# Patient Record
Sex: FEMALE | Employment: FULL TIME | URBAN - METROPOLITAN AREA
[De-identification: names, ages, dates, MRNs, and addresses within clinical notes are randomized per-mention and may not be internally consistent; named-entity substitution may affect disease eponyms.]

---

## 2019-02-22 ENCOUNTER — HOSPITAL ENCOUNTER (EMERGENCY)
Age: 44
Discharge: HOME | End: 2019-02-22
Attending: EMERGENCY MEDICINE
Payer: COMMERCIAL

## 2019-02-22 VITALS
HEART RATE: 83 BPM | RESPIRATION RATE: 16 BRPM | SYSTOLIC BLOOD PRESSURE: 145 MMHG | TEMPERATURE: 97.9 F | DIASTOLIC BLOOD PRESSURE: 94 MMHG | OXYGEN SATURATION: 98 %

## 2019-02-22 DIAGNOSIS — S69.91XA FISH HOOK INJURY OF RIGHT HAND, INITIAL ENCOUNTER: Primary | ICD-10-CM

## 2019-02-22 PROCEDURE — 99282 EMERGENCY DEPT VISIT SF MDM: CPT

## 2019-02-22 PROCEDURE — 74011250636 HC RX REV CODE- 250/636: Performed by: NURSE PRACTITIONER

## 2019-02-22 PROCEDURE — 90471 IMMUNIZATION ADMIN: CPT

## 2019-02-22 PROCEDURE — 74011000250 HC RX REV CODE- 250: Performed by: NURSE PRACTITIONER

## 2019-02-22 PROCEDURE — 90715 TDAP VACCINE 7 YRS/> IM: CPT | Performed by: NURSE PRACTITIONER

## 2019-02-22 PROCEDURE — 75810000121 HC INCSN/RMVL FB ANY OTHER SITE

## 2019-02-22 RX ORDER — LIDOCAINE HYDROCHLORIDE AND EPINEPHRINE 10; 10 MG/ML; UG/ML
5 INJECTION, SOLUTION INFILTRATION; PERINEURAL
Status: COMPLETED | OUTPATIENT
Start: 2019-02-22 | End: 2019-02-22

## 2019-02-22 RX ADMIN — LIDOCAINE HYDROCHLORIDE,EPINEPHRINE BITARTRATE 50 MG: 10; .01 INJECTION, SOLUTION INFILTRATION; PERINEURAL at 15:39

## 2019-02-22 RX ADMIN — TETANUS TOXOID, REDUCED DIPHTHERIA TOXOID AND ACELLULAR PERTUSSIS VACCINE, ADSORBED 0.5 ML: 5; 2.5; 8; 8; 2.5 SUSPENSION INTRAMUSCULAR at 15:40

## 2019-02-22 NOTE — DISCHARGE INSTRUCTIONS
Thank you for allowing us to care for you today. Please follow-up with your Primary Care provider in the next 2-3 days if your symptoms do not improve. Plan for home:     Please call, return to the ED or see your Primary Care Provider  if there are signs or symptoms of wound infection: fevers, chills, sweating, fast heartbeat, or wounds with increased warmth, increased redness, malodor (bad smelling), purulent (pus) drainage and/or pain that is increased, new or difficult to control. No Swimming, tub baths or hot tubs until your wounds have healed. Come back to the ER if you have worsening symptoms, fevers over 100.9, shaking chills, nausea or vomiting.

## 2019-02-22 NOTE — ED TRIAGE NOTES
Pt was at [employer] and digging through bin that had fishing riki in it and got fish hook stuck in right hand.

## 2019-02-22 NOTE — ED NOTES
Hand irrigated and wrapped with dressing. Discharge instructions given to patient by MD and nurse. Pt has been given counseling on medication use and verbalizes understanding. Pt ambulated off of unit in no signs of distress.

## 2019-02-22 NOTE — ED PROVIDER NOTES
CC: Fish hook in the right hand. Stuck hand in a bin at [employer] and a fishing hook was caught on the dorsal surface of the right hand Last tetanus . Unknown hook. Will update tetanus. No further complaints. PCP: Clarence Fulton MD 
 
 
The history is provided by the patient. No  was used. Past Medical History:  
Diagnosis Date  Abnormal Pap smear   
 in teens repeat fine  Abnormal Papanicolaou smear of cervix   
 at age 23 - \"froze off abnormal cells\"  HX OTHER MEDICAL   
 5 D&C  2006-twice, 2007, 2010 twice  Infertility 7 miscarriages  Infertility, female   
 hx of 10 miscarriages - reason unknown  Postpartum depression   
 took meds after 2nd delivery No past surgical history on file. Family History:  
Problem Relation Age of Onset  Hypertension Mother  Hypertension Father  Cancer Maternal Grandmother   
     breast and kidney  Hypertension Maternal Grandmother  Hypertension Maternal Grandfather  Cancer Paternal Grandfather   
     throat Social History Socioeconomic History  Marital status:  Spouse name: Not on file  Number of children: Not on file  Years of education: Not on file  Highest education level: Not on file Social Needs  Financial resource strain: Not on file  Food insecurity - worry: Not on file  Food insecurity - inability: Not on file  Transportation needs - medical: Not on file  Transportation needs - non-medical: Not on file Occupational History  Not on file Tobacco Use  Smoking status: Former Smoker Last attempt to quit: 2009 Years since quittin.2 Substance and Sexual Activity  Alcohol use: No  
 Drug use: No  
 Sexual activity: Yes  
  Partners: Male Birth control/protection: None Other Topics Concern  Not on file Social History Narrative  Not on file ALLERGIES: Patient has no known allergies. Review of Systems Skin: Positive for wound. All other systems reviewed and are negative. Vitals:  
 02/22/19 1504 Pulse: 99 SpO2: 99% Physical Exam  
Constitutional: She appears well-developed and well-nourished. HENT:  
Head: Atraumatic. Eyes: EOM are normal.  
Neck: No tracheal deviation present. Pulmonary/Chest: Effort normal. No respiratory distress. Musculoskeletal: Normal range of motion. Right hand: She exhibits laceration. Hands: 
Fish hook between the 2nd and 3rd fingers on the dorsal surface. Hook not all the way through Neurological: She is alert. Skin: Skin is warm and dry. Psychiatric: She has a normal mood and affect. Her behavior is normal. Judgment and thought content normal.  
Nursing note and vitals reviewed. MDM Foreign Body Removal 
Date/Time: 2/22/2019 3:27 PM 
Performed by: Joann Stafford NP Authorized by: Joann Stafford NP Consent:  
  Consent obtained:  Verbal 
  Consent given by:  Patient Risks discussed:  Infection Location: Location:  Hand Hand location:  R hand dorsum Depth: Intradermal 
Pre-procedure details:  
  Imaging:  None Neurovascular status: intact Anesthesia (see MAR for exact dosages): Anesthesia method:  Local infiltration Local anesthetic:  Lidocaine 1% WITH epi Procedure type:  
  Procedure complexity:  Simple Procedure details:  
  Scalpel size:  11 Incision length:  0.5 Dissection of underlying tissues: no   
  Bloodless field: yes Removal mechanism: Forceps Foreign bodies recovered:  1 Description:  Fish hook Intact foreign body removal: yes Post-procedure details:  
  Neurovascular status: intact Confirmation:  No additional foreign bodies on visualization Skin closure:  None Dressing:  Non-adherent dressing Patient tolerance of procedure:   Tolerated well, no immediate complications Assessment & Plan:  
 
Orders Placed This Encounter  diph,Pertuss(AC),Tet Vac-PF (BOOSTRIX) suspension 0.5 mL  lidocaine-EPINEPHrine (XYLOCAINE) 1 %-1:100,000 injection 50 mg Discussed with Burt Castillo MD,ED Provider Janine Young NP 
02/22/19 
3:27 PM 
 
 
3:56 PM 
The patient has been reevaluated. The patient is ready for discharge. The patient's signs, symptoms, diagnosis, and discharge instructions have been discussed and the patient/ family has conveyed their understanding. The patient is to follow up as recommended or return to the ED should their symptoms worsen. Plan has been discussed and the patient is in agreement. LABORATORY TESTS: 
Labs Reviewed - No data to display IMAGING RESULTS: 
No results found. MEDICATIONS GIVEN: 
Medications diph,Pertuss(AC),Tet Vac-PF (BOOSTRIX) suspension 0.5 mL (0.5 mL IntraMUSCular Given 2/22/19 8260) lidocaine-EPINEPHrine (XYLOCAINE) 1 %-1:100,000 injection 50 mg (50 mg IntraDERMal Given by Provider 2/22/19 1532) IMPRESSION: 
1. Fish hook injury of right hand, initial encounter PLAN: 
1. Current Discharge Medication List  
  
CONTINUE these medications which have NOT CHANGED Details  
sertraline (ZOLOFT) 50 mg tablet Take 1 Tab by mouth daily. Qty: 30 Tab, Refills: 0  
  
  
 
2. Follow-up Information Follow up With Specialties Details Why Contact Info Sally Lagunas MD Obstetrics & Gynecology, Gynecology, Obstetrics Schedule an appointment as soon as possible for a visit As needed [address] [place] [address] 
[phone] [address] [place] [address] EMERGENCY DEP Emergency Medicine  As needed, If symptoms worsen [address] [place] [address] 
[phone] 3. Return to ED for new or worsening symptoms Janine Young NP

## 2023-01-26 ENCOUNTER — HOSPITAL ENCOUNTER (EMERGENCY)
Dept: CT IMAGING | Age: 48
End: 2023-01-26
Attending: EMERGENCY MEDICINE
Payer: COMMERCIAL

## 2023-01-26 ENCOUNTER — APPOINTMENT (OUTPATIENT)
Dept: GENERAL RADIOLOGY | Age: 48
End: 2023-01-26
Attending: EMERGENCY MEDICINE
Payer: COMMERCIAL

## 2023-01-26 ENCOUNTER — HOSPITAL ENCOUNTER (INPATIENT)
Age: 48
LOS: 1 days | Discharge: HOME | End: 2023-01-27
Attending: EMERGENCY MEDICINE | Admitting: INTERNAL MEDICINE
Payer: COMMERCIAL

## 2023-01-26 DIAGNOSIS — I49.8 BIGEMINY: ICD-10-CM

## 2023-01-26 DIAGNOSIS — S09.90XA TRAUMATIC INJURY OF HEAD, INITIAL ENCOUNTER: ICD-10-CM

## 2023-01-26 DIAGNOSIS — I49.3 PVC'S (PREMATURE VENTRICULAR CONTRACTIONS): ICD-10-CM

## 2023-01-26 DIAGNOSIS — R55 SYNCOPE AND COLLAPSE: Primary | ICD-10-CM

## 2023-01-26 LAB
ALBUMIN SERPL-MCNC: 3.9 G/DL (ref 3.5–5)
ALBUMIN/GLOB SERPL: 1.2 (ref 1.1–2.2)
ALP SERPL-CCNC: 60 U/L (ref 45–117)
ALT SERPL-CCNC: 70 U/L (ref 12–78)
ANION GAP SERPL CALC-SCNC: 8 MMOL/L (ref 5–15)
APPEARANCE UR: ABNORMAL
AST SERPL-CCNC: 48 U/L (ref 15–37)
BACTERIA URNS QL MICRO: NEGATIVE /HPF
BASOPHILS # BLD: 0 K/UL (ref 0–0.1)
BASOPHILS NFR BLD: 0 % (ref 0–1)
BILIRUB SERPL-MCNC: 0.5 MG/DL (ref 0.2–1)
BILIRUB UR QL: NEGATIVE
BUN SERPL-MCNC: 16 MG/DL (ref 6–20)
BUN/CREAT SERPL: 20 (ref 12–20)
CALCIUM SERPL-MCNC: 8.6 MG/DL (ref 8.5–10.1)
CHLORIDE SERPL-SCNC: 104 MMOL/L (ref 97–108)
CO2 SERPL-SCNC: 26 MMOL/L (ref 21–32)
COLOR UR: ABNORMAL
COMMENT, HOLDF: NORMAL
CREAT SERPL-MCNC: 0.82 MG/DL (ref 0.55–1.02)
DIFFERENTIAL METHOD BLD: ABNORMAL
EOSINOPHIL # BLD: 0 K/UL (ref 0–0.4)
EOSINOPHIL NFR BLD: 0 % (ref 0–7)
EPITH CASTS URNS QL MICRO: ABNORMAL /LPF
ERYTHROCYTE [DISTWIDTH] IN BLOOD BY AUTOMATED COUNT: 11.8 % (ref 11.5–14.5)
FLUAV RNA SPEC QL NAA+PROBE: NOT DETECTED
FLUBV RNA SPEC QL NAA+PROBE: NOT DETECTED
GLOBULIN SER CALC-MCNC: 3.2 G/DL (ref 2–4)
GLUCOSE SERPL-MCNC: 108 MG/DL (ref 65–100)
GLUCOSE UR STRIP.AUTO-MCNC: NEGATIVE MG/DL
HCT VFR BLD AUTO: 39.3 % (ref 35–47)
HGB BLD-MCNC: 13 G/DL (ref 11.5–16)
HGB UR QL STRIP: NEGATIVE
IMM GRANULOCYTES # BLD AUTO: 0 K/UL (ref 0–0.04)
IMM GRANULOCYTES NFR BLD AUTO: 0 % (ref 0–0.5)
KETONES UR QL STRIP.AUTO: NEGATIVE MG/DL
LEUKOCYTE ESTERASE UR QL STRIP.AUTO: ABNORMAL
LYMPHOCYTES # BLD: 0.5 K/UL (ref 0.8–3.5)
LYMPHOCYTES NFR BLD: 6 % (ref 12–49)
MCH RBC QN AUTO: 29.8 PG (ref 26–34)
MCHC RBC AUTO-ENTMCNC: 33.1 G/DL (ref 30–36.5)
MCV RBC AUTO: 90.1 FL (ref 80–99)
MONOCYTES # BLD: 0.3 K/UL (ref 0–1)
MONOCYTES NFR BLD: 4 % (ref 5–13)
NEUTS SEG # BLD: 6.8 K/UL (ref 1.8–8)
NEUTS SEG NFR BLD: 90 % (ref 32–75)
NITRITE UR QL STRIP.AUTO: NEGATIVE
NRBC # BLD: 0 K/UL (ref 0–0.01)
NRBC BLD-RTO: 0 PER 100 WBC
PH UR STRIP: 8 (ref 5–8)
PLATELET # BLD AUTO: 193 K/UL (ref 150–400)
PMV BLD AUTO: 10.6 FL (ref 8.9–12.9)
POTASSIUM SERPL-SCNC: 4.1 MMOL/L (ref 3.5–5.1)
PROT SERPL-MCNC: 7.1 G/DL (ref 6.4–8.2)
PROT UR STRIP-MCNC: ABNORMAL MG/DL
RBC # BLD AUTO: 4.36 M/UL (ref 3.8–5.2)
RBC #/AREA URNS HPF: ABNORMAL /HPF (ref 0–5)
RBC MORPH BLD: ABNORMAL
SAMPLES BEING HELD,HOLD: NORMAL
SARS-COV-2, COV2: NOT DETECTED
SODIUM SERPL-SCNC: 138 MMOL/L (ref 136–145)
SP GR UR REFRACTOMETRY: 1.02 (ref 1–1.03)
TROPONIN-HIGH SENSITIVITY: <4 NG/L (ref 0–51)
UA: UC IF INDICATED,UAUC: ABNORMAL
UROBILINOGEN UR QL STRIP.AUTO: 1 EU/DL (ref 0.2–1)
WBC # BLD AUTO: 7.6 K/UL (ref 3.6–11)
WBC URNS QL MICRO: ABNORMAL /HPF (ref 0–4)

## 2023-01-26 PROCEDURE — 85025 COMPLETE CBC W/AUTO DIFF WBC: CPT

## 2023-01-26 PROCEDURE — 81001 URINALYSIS AUTO W/SCOPE: CPT

## 2023-01-26 PROCEDURE — 96360 HYDRATION IV INFUSION INIT: CPT

## 2023-01-26 PROCEDURE — 99285 EMERGENCY DEPT VISIT HI MDM: CPT

## 2023-01-26 PROCEDURE — 70450 CT HEAD/BRAIN W/O DYE: CPT

## 2023-01-26 PROCEDURE — 80053 COMPREHEN METABOLIC PANEL: CPT

## 2023-01-26 PROCEDURE — 71046 X-RAY EXAM CHEST 2 VIEWS: CPT

## 2023-01-26 PROCEDURE — 74011250637 HC RX REV CODE- 250/637: Performed by: EMERGENCY MEDICINE

## 2023-01-26 PROCEDURE — 74011250636 HC RX REV CODE- 250/636: Performed by: EMERGENCY MEDICINE

## 2023-01-26 PROCEDURE — 70486 CT MAXILLOFACIAL W/O DYE: CPT

## 2023-01-26 PROCEDURE — 65270000046 HC RM TELEMETRY

## 2023-01-26 PROCEDURE — G0378 HOSPITAL OBSERVATION PER HR: HCPCS

## 2023-01-26 PROCEDURE — 36415 COLL VENOUS BLD VENIPUNCTURE: CPT

## 2023-01-26 PROCEDURE — 93005 ELECTROCARDIOGRAM TRACING: CPT

## 2023-01-26 PROCEDURE — 84484 ASSAY OF TROPONIN QUANT: CPT

## 2023-01-26 PROCEDURE — 87636 SARSCOV2 & INF A&B AMP PRB: CPT

## 2023-01-26 RX ORDER — METOPROLOL TARTRATE 25 MG/1
25 TABLET, FILM COATED ORAL ONCE
Status: COMPLETED | OUTPATIENT
Start: 2023-01-26 | End: 2023-01-26

## 2023-01-26 RX ORDER — PAROXETINE HYDROCHLORIDE 20 MG/1
20 TABLET, FILM COATED ORAL DAILY
COMMUNITY

## 2023-01-26 RX ORDER — ACETAMINOPHEN 325 MG/1
650 TABLET ORAL
Status: COMPLETED | OUTPATIENT
Start: 2023-01-26 | End: 2023-01-26

## 2023-01-26 RX ADMIN — METOPROLOL TARTRATE 25 MG: 25 TABLET, FILM COATED ORAL at 22:51

## 2023-01-26 RX ADMIN — SODIUM CHLORIDE 1000 ML: 9 INJECTION, SOLUTION INTRAVENOUS at 18:47

## 2023-01-26 RX ADMIN — ACETAMINOPHEN 650 MG: 325 TABLET ORAL at 20:08

## 2023-01-26 RX ADMIN — SODIUM CHLORIDE 1000 ML: 9 INJECTION, SOLUTION INTRAVENOUS at 22:52

## 2023-01-26 NOTE — ED TRIAGE NOTES
Pt comes to ED from home with reports of not feeling well all day. Around 1700 pt went upstairs and had a syncopal episode. Pt reports loosing consciousness. She states she woke up to her son looking over her. She has an abrasion to left forehead/above left eye brow.

## 2023-01-27 ENCOUNTER — TELEPHONE (OUTPATIENT)
Dept: CARDIOLOGY CLINIC | Age: 48
End: 2023-01-27

## 2023-01-27 ENCOUNTER — APPOINTMENT (OUTPATIENT)
Dept: NON INVASIVE DIAGNOSTICS | Age: 48
End: 2023-01-27
Attending: INTERNAL MEDICINE
Payer: COMMERCIAL

## 2023-01-27 VITALS
RESPIRATION RATE: 15 BRPM | WEIGHT: 188 LBS | DIASTOLIC BLOOD PRESSURE: 82 MMHG | TEMPERATURE: 99.2 F | SYSTOLIC BLOOD PRESSURE: 115 MMHG | BODY MASS INDEX: 32.1 KG/M2 | HEART RATE: 75 BPM | OXYGEN SATURATION: 96 % | HEIGHT: 64 IN

## 2023-01-27 LAB
ALBUMIN SERPL-MCNC: 3.2 G/DL (ref 3.5–5)
ALBUMIN/GLOB SERPL: 1.1 (ref 1.1–2.2)
ALP SERPL-CCNC: 52 U/L (ref 45–117)
ALT SERPL-CCNC: 52 U/L (ref 12–78)
ANION GAP SERPL CALC-SCNC: 4 MMOL/L (ref 5–15)
AST SERPL-CCNC: 28 U/L (ref 15–37)
ATRIAL RATE: 100 BPM
ATRIAL RATE: 109 BPM
ATRIAL RATE: 78 BPM
BASOPHILS # BLD: 0 K/UL (ref 0–0.1)
BASOPHILS NFR BLD: 0 % (ref 0–1)
BILIRUB SERPL-MCNC: 0.4 MG/DL (ref 0.2–1)
BUN SERPL-MCNC: 10 MG/DL (ref 6–20)
BUN/CREAT SERPL: 17 (ref 12–20)
CALCIUM SERPL-MCNC: 8.3 MG/DL (ref 8.5–10.1)
CALCULATED P AXIS, ECG09: 45 DEGREES
CALCULATED P AXIS, ECG09: 51 DEGREES
CALCULATED P AXIS, ECG09: 63 DEGREES
CALCULATED R AXIS, ECG10: 16 DEGREES
CALCULATED R AXIS, ECG10: 68 DEGREES
CALCULATED R AXIS, ECG10: 68 DEGREES
CALCULATED T AXIS, ECG11: 28 DEGREES
CALCULATED T AXIS, ECG11: 77 DEGREES
CALCULATED T AXIS, ECG11: 80 DEGREES
CHLORIDE SERPL-SCNC: 112 MMOL/L (ref 97–108)
CHOLEST SERPL-MCNC: 161 MG/DL
CO2 SERPL-SCNC: 26 MMOL/L (ref 21–32)
CREAT SERPL-MCNC: 0.6 MG/DL (ref 0.55–1.02)
D DIMER PPP FEU-MCNC: 0.79 MG/L FEU (ref 0–0.65)
DIAGNOSIS, 93000: NORMAL
DIFFERENTIAL METHOD BLD: NORMAL
EOSINOPHIL # BLD: 0 K/UL (ref 0–0.4)
EOSINOPHIL NFR BLD: 0 % (ref 0–7)
ERYTHROCYTE [DISTWIDTH] IN BLOOD BY AUTOMATED COUNT: 11.9 % (ref 11.5–14.5)
GLOBULIN SER CALC-MCNC: 3 G/DL (ref 2–4)
GLUCOSE SERPL-MCNC: 105 MG/DL (ref 65–100)
HCT VFR BLD AUTO: 36.1 % (ref 35–47)
HDLC SERPL-MCNC: 45 MG/DL
HDLC SERPL: 3.6 (ref 0–5)
HGB BLD-MCNC: 11.9 G/DL (ref 11.5–16)
IMM GRANULOCYTES # BLD AUTO: 0 K/UL (ref 0–0.04)
IMM GRANULOCYTES NFR BLD AUTO: 0 % (ref 0–0.5)
LDLC SERPL CALC-MCNC: 95.6 MG/DL (ref 0–100)
LYMPHOCYTES # BLD: 0.9 K/UL (ref 0.8–3.5)
LYMPHOCYTES NFR BLD: 23 % (ref 12–49)
MAGNESIUM SERPL-MCNC: 2.1 MG/DL (ref 1.6–2.4)
MCH RBC QN AUTO: 29.5 PG (ref 26–34)
MCHC RBC AUTO-ENTMCNC: 33 G/DL (ref 30–36.5)
MCV RBC AUTO: 89.6 FL (ref 80–99)
MONOCYTES # BLD: 0.2 K/UL (ref 0–1)
MONOCYTES NFR BLD: 5 % (ref 5–13)
NEUTS SEG # BLD: 2.8 K/UL (ref 1.8–8)
NEUTS SEG NFR BLD: 72 % (ref 32–75)
NRBC # BLD: 0 K/UL (ref 0–0.01)
NRBC BLD-RTO: 0 PER 100 WBC
P-R INTERVAL, ECG05: 152 MS
P-R INTERVAL, ECG05: 156 MS
P-R INTERVAL, ECG05: 160 MS
PHOSPHATE SERPL-MCNC: 3.2 MG/DL (ref 2.6–4.7)
PLATELET # BLD AUTO: 181 K/UL (ref 150–400)
PMV BLD AUTO: 10.3 FL (ref 8.9–12.9)
POTASSIUM SERPL-SCNC: 3.6 MMOL/L (ref 3.5–5.1)
PROT SERPL-MCNC: 6.2 G/DL (ref 6.4–8.2)
Q-T INTERVAL, ECG07: 332 MS
Q-T INTERVAL, ECG07: 350 MS
Q-T INTERVAL, ECG07: 378 MS
QRS DURATION, ECG06: 80 MS
QRS DURATION, ECG06: 84 MS
QRS DURATION, ECG06: 84 MS
QTC CALCULATION (BEZET), ECG08: 428 MS
QTC CALCULATION (BEZET), ECG08: 430 MS
QTC CALCULATION (BEZET), ECG08: 471 MS
RBC # BLD AUTO: 4.03 M/UL (ref 3.8–5.2)
SODIUM SERPL-SCNC: 142 MMOL/L (ref 136–145)
TRIGL SERPL-MCNC: 102 MG/DL (ref ?–150)
TROPONIN-HIGH SENSITIVITY: 4 NG/L (ref 0–51)
TSH SERPL DL<=0.05 MIU/L-ACNC: 0.71 UIU/ML (ref 0.36–3.74)
VENTRICULAR RATE, ECG03: 100 BPM
VENTRICULAR RATE, ECG03: 109 BPM
VENTRICULAR RATE, ECG03: 78 BPM
VLDLC SERPL CALC-MCNC: 20.4 MG/DL
WBC # BLD AUTO: 4 K/UL (ref 3.6–11)

## 2023-01-27 PROCEDURE — 99222 1ST HOSP IP/OBS MODERATE 55: CPT | Performed by: SPECIALIST

## 2023-01-27 PROCEDURE — 84100 ASSAY OF PHOSPHORUS: CPT

## 2023-01-27 PROCEDURE — 85379 FIBRIN DEGRADATION QUANT: CPT

## 2023-01-27 PROCEDURE — 74011250637 HC RX REV CODE- 250/637: Performed by: INTERNAL MEDICINE

## 2023-01-27 PROCEDURE — G0378 HOSPITAL OBSERVATION PER HR: HCPCS

## 2023-01-27 PROCEDURE — 74011000250 HC RX REV CODE- 250: Performed by: INTERNAL MEDICINE

## 2023-01-27 PROCEDURE — 85025 COMPLETE CBC W/AUTO DIFF WBC: CPT

## 2023-01-27 PROCEDURE — 80061 LIPID PANEL: CPT

## 2023-01-27 PROCEDURE — 84443 ASSAY THYROID STIM HORMONE: CPT

## 2023-01-27 PROCEDURE — 93005 ELECTROCARDIOGRAM TRACING: CPT

## 2023-01-27 PROCEDURE — 74011250636 HC RX REV CODE- 250/636: Performed by: INTERNAL MEDICINE

## 2023-01-27 PROCEDURE — 84484 ASSAY OF TROPONIN QUANT: CPT

## 2023-01-27 PROCEDURE — 96372 THER/PROPH/DIAG INJ SC/IM: CPT

## 2023-01-27 PROCEDURE — 36415 COLL VENOUS BLD VENIPUNCTURE: CPT

## 2023-01-27 PROCEDURE — 80053 COMPREHEN METABOLIC PANEL: CPT

## 2023-01-27 PROCEDURE — 96361 HYDRATE IV INFUSION ADD-ON: CPT

## 2023-01-27 PROCEDURE — 83735 ASSAY OF MAGNESIUM: CPT

## 2023-01-27 RX ORDER — ACETAMINOPHEN 650 MG/1
650 SUPPOSITORY RECTAL
Status: DISCONTINUED | OUTPATIENT
Start: 2023-01-27 | End: 2023-01-27 | Stop reason: HOSPADM

## 2023-01-27 RX ORDER — PAROXETINE HYDROCHLORIDE 20 MG/1
20 TABLET, FILM COATED ORAL DAILY
Status: DISCONTINUED | OUTPATIENT
Start: 2023-01-27 | End: 2023-01-27 | Stop reason: HOSPADM

## 2023-01-27 RX ORDER — POLYETHYLENE GLYCOL 3350 17 G/17G
17 POWDER, FOR SOLUTION ORAL DAILY PRN
Status: DISCONTINUED | OUTPATIENT
Start: 2023-01-27 | End: 2023-01-27 | Stop reason: HOSPADM

## 2023-01-27 RX ORDER — SODIUM CHLORIDE 0.9 % (FLUSH) 0.9 %
5-40 SYRINGE (ML) INJECTION EVERY 8 HOURS
Status: DISCONTINUED | OUTPATIENT
Start: 2023-01-27 | End: 2023-01-27 | Stop reason: HOSPADM

## 2023-01-27 RX ORDER — ENOXAPARIN SODIUM 100 MG/ML
40 INJECTION SUBCUTANEOUS DAILY
Status: DISCONTINUED | OUTPATIENT
Start: 2023-01-27 | End: 2023-01-27 | Stop reason: HOSPADM

## 2023-01-27 RX ORDER — ONDANSETRON 4 MG/1
4 TABLET, ORALLY DISINTEGRATING ORAL
Status: DISCONTINUED | OUTPATIENT
Start: 2023-01-27 | End: 2023-01-27 | Stop reason: HOSPADM

## 2023-01-27 RX ORDER — SODIUM CHLORIDE 0.9 % (FLUSH) 0.9 %
5-40 SYRINGE (ML) INJECTION AS NEEDED
Status: DISCONTINUED | OUTPATIENT
Start: 2023-01-27 | End: 2023-01-27 | Stop reason: HOSPADM

## 2023-01-27 RX ORDER — ACETAMINOPHEN 325 MG/1
650 TABLET ORAL
Status: DISCONTINUED | OUTPATIENT
Start: 2023-01-27 | End: 2023-01-27 | Stop reason: HOSPADM

## 2023-01-27 RX ORDER — ONDANSETRON 2 MG/ML
4 INJECTION INTRAMUSCULAR; INTRAVENOUS
Status: DISCONTINUED | OUTPATIENT
Start: 2023-01-27 | End: 2023-01-27 | Stop reason: HOSPADM

## 2023-01-27 RX ORDER — SODIUM CHLORIDE, SODIUM LACTATE, POTASSIUM CHLORIDE, CALCIUM CHLORIDE 600; 310; 30; 20 MG/100ML; MG/100ML; MG/100ML; MG/100ML
100 INJECTION, SOLUTION INTRAVENOUS CONTINUOUS
Status: DISCONTINUED | OUTPATIENT
Start: 2023-01-27 | End: 2023-01-27 | Stop reason: HOSPADM

## 2023-01-27 RX ADMIN — SODIUM CHLORIDE, PRESERVATIVE FREE 10 ML: 5 INJECTION INTRAVENOUS at 06:33

## 2023-01-27 RX ADMIN — PAROXETINE HYDROCHLORIDE 20 MG: 20 TABLET, FILM COATED ORAL at 08:40

## 2023-01-27 RX ADMIN — SODIUM CHLORIDE, POTASSIUM CHLORIDE, SODIUM LACTATE AND CALCIUM CHLORIDE 100 ML/HR: 600; 310; 30; 20 INJECTION, SOLUTION INTRAVENOUS at 06:33

## 2023-01-27 RX ADMIN — ENOXAPARIN SODIUM 40 MG: 100 INJECTION SUBCUTANEOUS at 08:42

## 2023-01-27 RX ADMIN — ACETAMINOPHEN 650 MG: 325 TABLET ORAL at 08:40

## 2023-01-27 NOTE — ED PROVIDER NOTES
Hypotension   Functional status baseline:  [EPIC#1537^NOTE}   Syncope   Patient presents emergency department today after having a syncopal event. Patient says that she was not feeling well today when she suddenly \"passed out. \"  She did hit the side of her face and head. After feeling lightheaded and does also describe some sensation of not feeling during the day. However denies any fevers chills or cough. No active chest pain no palpitations. No fevers or chills. No neck pain. Does have some mild headache related to her head injury after passing out. No associated neurological weakness. Past Medical History:   Diagnosis Date    Abnormal Pap smear     in teens repeat fine    Abnormal Papanicolaou smear of cervix     at age 23 - \"froze off abnormal cells\"    HX OTHER MEDICAL     5 D&C  2006-twice, 2007, 2010 twice    Infertility     7 miscarriages    Infertility, female     hx of 10 miscarriages - reason unknown    Postpartum depression     took meds after 2nd delivery       No past surgical history on file.       Family History:   Problem Relation Age of Onset    Hypertension Mother     Hypertension Father     Cancer Maternal Grandmother         breast and kidney    Hypertension Maternal Grandmother     Hypertension Maternal Grandfather     Cancer Paternal Grandfather         throat       Social History     Socioeconomic History    Marital status:      Spouse name: Not on file    Number of children: Not on file    Years of education: Not on file    Highest education level: Not on file   Occupational History    Not on file   Tobacco Use    Smoking status: Former     Types: Cigarettes     Quit date: 2009     Years since quittin.1    Smokeless tobacco: Not on file   Substance and Sexual Activity    Alcohol use: No    Drug use: No    Sexual activity: Yes     Partners: Male     Birth control/protection: None   Other Topics Concern    Not on file   Social History Narrative    Works for [employer]     Social Determinants of Health     Financial Resource Strain: Not on file   Food Insecurity: Not on file   Transportation Needs: Not on file   Physical Activity: Not on file   Stress: Not on file   Social Connections: Not on file   Intimate Partner Violence: Not on file   Housing Stability: Not on file         ALLERGIES: Patient has no known allergies. Review of Systems   Cardiovascular:  Positive for syncope. All review of systems reviewed and otherwise negative for except for HPI  Vitals:    01/26/23 1815 01/26/23 1834 01/26/23 2118   BP: 113/67 99/68 95/61   Pulse: (!) 104 (!) 101 (!) 104   Resp: 20 18 24   Temp: 98.6 °F (37 °C)  98.6 °F (37 °C)   SpO2: 99%  96%   Weight: 85.3 kg (188 lb)     Height: 5' 4\" (1.626 m)              Physical Exam   pulse Ox Evaluation: Normal.    Physical Exam: PHYSICAL EXAMINATION  GENERAL: awake, alert and in no acute distress. A and O x 3. HEENT: Normocephalic, atraumatic. The pupils are equal, round and react to light appropriately. Extraoccular motions appear intact. Mucous membranes moist. Clear TM bilaterally. NECK: Trachea midline. No JVD. LUNGS: CTAB, no increased work of breathing. No wheezes, no rales, or retractions. HEART: Sinus tachycardia. No murmurs  GASTROINTESTINAL: soft, nontender, nondistended without any rigidity, rebound or guarding. No CVA tenderness. normal bowel sounds. EXTREMITIES: No clubbing, cyanosis or edema. 2+and symmetric peripheral pulses in lower extremities. GENITOURINARY: deferred  SKIN: Warm and dry with normal color and turgor. Normal cap refill. NEUROLOGICAL: Awake, alert and answering questions appropriately. Facial symmetry is normal. Moving all extremities equally on exam. CN nerves 2-12 intact. 5/5 strength upper and lower extremity. normal gait. No hyper-reflexia. Nl sensation to light touch and pin prick. No focal neurological deficit. GCS 15. No neck meningismus.  Negative Kernig and Brudzinski sign.  MUSCULOSKELETAL: Good strength and tone overall. No bony tenderness. PSYCHIATRIC: Normal affect, insight, concentration. Medical decision making:    Patient is a 59-year-old female that presents emerged department after having a syncopal event including head trauma. And face trauma. Patient does have comorbidities as well as symptoms that also have head trauma due to the syncopal event. Differential diagnosis includes cardiovascular versus neurogenic versus vasovagal versus electrode abnormality versus hypoglycemia versus influenza versus COVID-19 versus skull fracture versus intracranial hemorrhage due to this fall. EKG was performed which did demonstrate frequent PVCs and sinus tachycardia including PVCs in a pattern of bigeminy. EKG did not demonstrate ST elevation or ST depression. Additionally CT of the head and face revealed no fracture or intracranial hemorrhage. I did also consider infectious etiology however no evidence of pneumonia or influenza or COVID-19 were positive. Patient did have borderline hypotension of 95/70. We will start IV fluids. Given her sinus tachycardia as well as PVCs in the pattern of bigeminy she was started on IV fluids as well as metoprolol. Given her borderline hypotension and EKG findings she was admitted under the care of the hospital service for observation. I did review her labs which are below and otherwise without any evidence of anemia. She is admitted onto the hospital service for observation. Diagnosis:    1.   Syncope #2 head trauma #3 facial trauma #4 frequent PVCs and pattern of bigeminy #5 borderline hypertension    Disposition patient admitted under to the hospital service  Recent Results (from the past 24 hour(s))   CBC WITH AUTOMATED DIFF    Collection Time: 01/26/23  6:23 PM   Result Value Ref Range    WBC 7.6 3.6 - 11.0 K/uL    RBC 4.36 3.80 - 5.20 M/uL    HGB 13.0 11.5 - 16.0 g/dL    HCT 39.3 35.0 - 47.0 %    MCV 90.1 80.0 - 99.0 FL MCH 29.8 26.0 - 34.0 PG    MCHC 33.1 30.0 - 36.5 g/dL    RDW 11.8 11.5 - 14.5 %    PLATELET 157 741 - 938 K/uL    MPV 10.6 8.9 - 12.9 FL    NRBC 0.0 0  WBC    ABSOLUTE NRBC 0.00 0.00 - 0.01 K/uL    NEUTROPHILS 90 (H) 32 - 75 %    LYMPHOCYTES 6 (L) 12 - 49 %    MONOCYTES 4 (L) 5 - 13 %    EOSINOPHILS 0 0 - 7 %    BASOPHILS 0 0 - 1 %    IMMATURE GRANULOCYTES 0 0.0 - 0.5 %    ABS. NEUTROPHILS 6.8 1.8 - 8.0 K/UL    ABS. LYMPHOCYTES 0.5 (L) 0.8 - 3.5 K/UL    ABS. MONOCYTES 0.3 0.0 - 1.0 K/UL    ABS. EOSINOPHILS 0.0 0.0 - 0.4 K/UL    ABS. BASOPHILS 0.0 0.0 - 0.1 K/UL    ABS. IMM. GRANS. 0.0 0.00 - 0.04 K/UL    DF SMEAR SCANNED      RBC COMMENTS NORMOCYTIC, NORMOCHROMIC     METABOLIC PANEL, COMPREHENSIVE    Collection Time: 01/26/23  6:23 PM   Result Value Ref Range    Sodium 138 136 - 145 mmol/L    Potassium 4.1 3.5 - 5.1 mmol/L    Chloride 104 97 - 108 mmol/L    CO2 26 21 - 32 mmol/L    Anion gap 8 5 - 15 mmol/L    Glucose 108 (H) 65 - 100 mg/dL    BUN 16 6 - 20 MG/DL    Creatinine 0.82 0.55 - 1.02 MG/DL    BUN/Creatinine ratio 20 12 - 20      eGFR >60 >60 ml/min/1.73m2    Calcium 8.6 8.5 - 10.1 MG/DL    Bilirubin, total 0.5 0.2 - 1.0 MG/DL    ALT (SGPT) 70 12 - 78 U/L    AST (SGOT) 48 (H) 15 - 37 U/L    Alk. phosphatase 60 45 - 117 U/L    Protein, total 7.1 6.4 - 8.2 g/dL    Albumin 3.9 3.5 - 5.0 g/dL    Globulin 3.2 2.0 - 4.0 g/dL    A-G Ratio 1.2 1.1 - 2.2     SAMPLES BEING HELD    Collection Time: 01/26/23  6:23 PM   Result Value Ref Range    SAMPLES BEING HELD 1RED, 1BLU     COMMENT        Add-on orders for these samples will be processed based on acceptable specimen integrity and analyte stability, which may vary by analyte.    URINALYSIS W/ REFLEX CULTURE    Collection Time: 01/26/23  6:23 PM    Specimen: Urine   Result Value Ref Range    Color YELLOW/STRAW      Appearance CLOUDY (A) CLEAR      Specific gravity 1.023 1.003 - 1.030      pH (UA) 8.0 5.0 - 8.0      Protein TRACE (A) NEG mg/dL    Glucose Negative NEG mg/dL    Ketone Negative NEG mg/dL    Bilirubin Negative NEG      Blood Negative NEG      Urobilinogen 1.0 0.2 - 1.0 EU/dL    Nitrites Negative NEG      Leukocyte Esterase TRACE (A) NEG      WBC 0-4 0 - 4 /hpf    RBC 0-5 0 - 5 /hpf    Epithelial cells FEW FEW /lpf    Bacteria Negative NEG /hpf    UA:UC IF INDICATED CULTURE NOT INDICATED BY UA RESULT CNI     TROPONIN-HIGH SENSITIVITY    Collection Time: 01/26/23  6:23 PM   Result Value Ref Range    Troponin-High Sensitivity <4 0 - 51 ng/L   COVID-19 WITH INFLUENZA A/B    Collection Time: 01/26/23  7:22 PM   Result Value Ref Range    SARS-CoV-2 by PCR Not detected NOTD      Influenza A by PCR Not detected NOTD      Influenza B by PCR Not detected NOTD     METABOLIC PANEL, COMPREHENSIVE    Collection Time: 01/27/23  4:57 AM   Result Value Ref Range    Sodium 142 136 - 145 mmol/L    Potassium 3.6 3.5 - 5.1 mmol/L    Chloride 112 (H) 97 - 108 mmol/L    CO2 26 21 - 32 mmol/L    Anion gap 4 (L) 5 - 15 mmol/L    Glucose 105 (H) 65 - 100 mg/dL    BUN 10 6 - 20 MG/DL    Creatinine 0.60 0.55 - 1.02 MG/DL    BUN/Creatinine ratio 17 12 - 20      eGFR >60 >60 ml/min/1.73m2    Calcium 8.3 (L) 8.5 - 10.1 MG/DL    Bilirubin, total 0.4 0.2 - 1.0 MG/DL    ALT (SGPT) 52 12 - 78 U/L    AST (SGOT) 28 15 - 37 U/L    Alk.  phosphatase 52 45 - 117 U/L    Protein, total 6.2 (L) 6.4 - 8.2 g/dL    Albumin 3.2 (L) 3.5 - 5.0 g/dL    Globulin 3.0 2.0 - 4.0 g/dL    A-G Ratio 1.1 1.1 - 2.2     CBC WITH AUTOMATED DIFF    Collection Time: 01/27/23  4:57 AM   Result Value Ref Range    WBC 4.0 3.6 - 11.0 K/uL    RBC 4.03 3.80 - 5.20 M/uL    HGB 11.9 11.5 - 16.0 g/dL    HCT 36.1 35.0 - 47.0 %    MCV 89.6 80.0 - 99.0 FL    MCH 29.5 26.0 - 34.0 PG    MCHC 33.0 30.0 - 36.5 g/dL    RDW 11.9 11.5 - 14.5 %    PLATELET 570 521 - 701 K/uL    MPV 10.3 8.9 - 12.9 FL    NRBC 0.0 0  WBC    ABSOLUTE NRBC 0.00 0.00 - 0.01 K/uL    NEUTROPHILS 72 32 - 75 %    LYMPHOCYTES 23 12 - 49 %    MONOCYTES 5 5 - 13 %    EOSINOPHILS 0 0 - 7 %    BASOPHILS 0 0 - 1 %    IMMATURE GRANULOCYTES 0 0.0 - 0.5 %    ABS. NEUTROPHILS 2.8 1.8 - 8.0 K/UL    ABS. LYMPHOCYTES 0.9 0.8 - 3.5 K/UL    ABS. MONOCYTES 0.2 0.0 - 1.0 K/UL    ABS. EOSINOPHILS 0.0 0.0 - 0.4 K/UL    ABS. BASOPHILS 0.0 0.0 - 0.1 K/UL    ABS. IMM. GRANS. 0.0 0.00 - 0.04 K/UL    DF AUTOMATED     TSH 3RD GENERATION    Collection Time: 01/27/23  4:57 AM   Result Value Ref Range    TSH 0.71 0.36 - 3.74 uIU/mL   MAGNESIUM    Collection Time: 01/27/23  4:57 AM   Result Value Ref Range    Magnesium 2.1 1.6 - 2.4 mg/dL   PHOSPHORUS    Collection Time: 01/27/23  4:57 AM   Result Value Ref Range    Phosphorus 3.2 2.6 - 4.7 MG/DL   D DIMER    Collection Time: 01/27/23  4:57 AM   Result Value Ref Range    D-dimer 0.79 (H) 0.00 - 0.65 mg/L FEU   LIPID PANEL    Collection Time: 01/27/23  4:57 AM   Result Value Ref Range    Cholesterol, total 161 <200 MG/DL    Triglyceride 102 <150 MG/DL    HDL Cholesterol 45 MG/DL    LDL, calculated 95.6 0 - 100 MG/DL    VLDL, calculated 20.4 MG/DL    CHOL/HDL Ratio 3.6 0.0 - 5.0     TROPONIN-HIGH SENSITIVITY    Collection Time: 01/27/23  4:57 AM   Result Value Ref Range    Troponin-High Sensitivity 4 0 - 51 ng/L       XR CHEST PA LAT    Result Date: 1/26/2023  EXAM: XR CHEST PA LAT INDICATION: Near syncope COMPARISON: December 2016 TECHNIQUE: PA and lateral chest views FINDINGS: The cardiac size is within normal limits. The pulmonary vasculature is within normal limits. The lungs and pleural spaces are clear. The visualized bones and upper abdomen are age-appropriate. Normal PA and lateral chest views. CT HEAD WO CONT    Result Date: 1/26/2023  EXAM: CT HEAD WO CONT INDICATION: syncope, head trauma COMPARISON: None. CONTRAST: None. TECHNIQUE: Unenhanced CT of the head was performed using 5 mm images. Brain and bone windows were generated. Coronal and sagittal reformats.  CT dose reduction was achieved through use of a standardized protocol tailored for this examination and automatic exposure control for dose modulation. FINDINGS: The ventricles and sulci are normal in size, shape and configuration. There is no significant white matter disease. There is no intracranial hemorrhage, extra-axial collection, or mass effect. The basilar cisterns are open. No CT evidence of acute infarct. The bone windows demonstrate no abnormalities. The visualized portions of the paranasal sinuses and mastoid air cells are clear. No evidence of acute process. CT MAXILLOFACIAL WO CONT    Result Date: 1/26/2023  EXAM: CT MAXILLOFACIAL WO CONT INDICATION: facial trauma COMPARISON: None. CONTRAST:   None. TECHNIQUE:  Multislice helical CT of the facial bones was performed in the axial plane without intravenous contrast administration. Coronal and sagittal reformations were generated. CT dose reduction was achieved through use of a standardized protocol tailored for this examination and automatic exposure control for dose modulation. FINDINGS: Bones: There is no fracture or other osseous abnormality Paranasal sinuses: There is a mucus retention cyst in the right maxillary sinus. Orbits: The globes, optic nerves, and extraocular muscles are within normal limits. Base of brain and soft tissues: Within normal limits. No evidence of mass. Miscellaneous: N/A     No fracture. Perfect Serve Consult for Admission  10:09 PM    ED Room Number: DU95/16  Patient Name and age:   Bert Stratton 52 y.o.  female  Working Diagnosis: Syncope, frequent PVC and bigemeny, head trauma    COVID-19 Suspicion:  no  Sepsis present:  no  Reassessment needed: no  Code Status:  Full Code  Readmission: no  Isolation Requirements:  no  Recommended Level of Care:  telemetry  Department:[facility] Adult ED - 21   Other:

## 2023-01-27 NOTE — DISCHARGE SUMMARY
Discharge Summary       PATIENT ID: Carmon Claude  MRN: [id number]   YOB: 1975    DATE OF ADMISSION: 1/26/2023  6:09 PM    DATE OF DISCHARGE: 01/27/23    PRIMARY CARE PROVIDER: Anna Marie Cohn MD     ATTENDING PHYSICIAN: Jose Henderson MD    DISCHARGING PROVIDER: Jose Henderson MD      CONSULTATIONS: IP CONSULT TO CARDIOLOGY    PROCEDURES/SURGERIES: * No surgery found *    ADMISSION SUMMARY AND HOSPITAL COURSE:   This is a 49-year-old woman with past medical history significant for depression presented at the emergency room with syncope. The patient stated that she has not been feeling well all day long, went upstairs in her house, and all of a sudden she passed out. The patient had no recollection of the event. She woke up to her son looking over her. There was no headache, blurring of vision, or dizziness prior to this episode. No abnormal body movement. No tongue biting. No urinary incontinence. The patient sustained an abrasion to the left forehead. The patient has no prior history of TIA or CVA. She was brought to the emergency room for further evaluation. When the patient arrived at the emergency room, CT scan of the head was obtained and this was negative for acute pathology. The CT of the maxillofacial was also obtained which did not show any fracture. The patient was subsequently referred to the hospitalist service for admission. The patient has no record of prior admission to the hospitalist service. Pt was admitted and monitored. Her EKG revealed bigeminy. Pt has not ahd a h/o passing out in the past. She felt lightheaded, and nauseous prior to passing out. Likely secondary to vasovagal/orthostatic episode. No suspciion for stroke, no risk factors and no other deficits. Cancel MRI/MRA. D dimer slightly elevated suspicion for PE is very low given no CP or SOB, however due to tachycardia and bigeminy I did recommend a CTA to the family.  Long discussion regarding risk and benefit and given low D dimer level and no symptoms patient is comfortable going home and will come back if she experiences any symptoms of PE. Pt at this time declining CTA. DISCHARGE DIAGNOSES / PLAN:      Syncope - likely orthostatic or vasovagal   - Cancel MRI/MRA the risk is extremely low in this young patient. Discussed with patient and is in agreement  - Cancel carotid doppler. No indication per recent guidelines   - Telemetry monitoring revealed bigeminy. Now improved. - Cardiology consulted for Holter for home to be complete   - D dimer slightly elevated, no chest pain or shortness of breath. Pt declines CTA at this time. Suspicion is low       BMI: Body mass index is 32.27 kg/m². . This patient: Meets criteria for obesity given BMI >/= 30 and < 40 due to excess calories/nutritional. Weight loss and lifestyle modifications should be encouraged as an outpatient. PENDING TEST RESULTS:   At the time of discharge the following test results are still pending: None     ADDITIONAL CARE RECOMMENDATIONS:   - Please take all medications as prescribed. Note changes as below. **No changes. - Please make sure to follow up with your primary care physician within 1-2 weeks of discharge for hospital follow up. You should also follow up with Cardiology regarding your Holter monitor   - Please make sure to continue to monitor for: Chest pain, shortness of breath, high fevers, and return to the Emergency Department with any of these symptoms.   - Please get up slowly from a seated or laying position, avoid falls. - Avoid tobacco, alcohol and other illicit drug use. - Diet restrictions: None, drink plenty of fluids  - Activity restrictions: None  - Wound care: none        DIET: Resume previous diet    ACTIVITY: Activity as tolerated    EQUIPMENT needed: None    NOTIFY YOUR PHYSICIAN FOR ANY OF THE FOLLOWING:   Fever over 101 degrees for 24 hours.    Chest pain, shortness of breath, fever, chills, nausea, vomiting, diarrhea, change in mentation, falling, weakness, bleeding. Severe pain or pain not relieved by medications, as well as any other signs or symptoms that you may have questions about. FOLLOW UP APPOINTMENTS:    Follow-up Information       Follow up With Specialties Details Why Contact Mine Galvez MD Obstetrics & Gynecology, Gynecology, Obstetrics   [facility]  [address] [place] [address]  [phone]               DISCHARGE MEDICATIONS:  Current Discharge Medication List        CONTINUE these medications which have NOT CHANGED    Details   PARoxetine (PaxiL) 20 mg tablet Take 20 mg by mouth daily.              DISPOSITION:   X Home With:   OT  PT  HH  RN       Long term SNF/Inpatient Rehab    Independent/assisted living    Hospice    Other:     PATIENT CONDITION AT DISCHARGE:     Functional status    Poor     Deconditioned    X Independent      Cognition    X Lucid     Forgetful     Dementia      Catheters/lines (plus indication)    Golden     PICC     PEG    X None      Code status    X Full code     DNR      PHYSICAL EXAMINATION AT DISCHARGE:  Visit Vitals  /82 (BP 1 Location: Left upper arm, BP Patient Position: At rest;Lying)   Pulse 75   Temp 99.2 °F (37.3 °C)   Resp 15   Ht 5' 4\" (1.626 m)   Wt 85.3 kg (188 lb)   SpO2 96%   BMI 32.27 kg/m²     Gen: NAD, awake in bed  HEENT: NC/AT, sclera anicteric, PERRL, EOMI  CV: RRR no m/r/g, normal S1 and S2, no pedal edema   Resp: CTA b/l no increased work of breathing, no wheezing or rhonchi, speaking in full sentences   Abd: NT/ND, normal bowel sounds, no rebound or guarding  Ext: 2+ pulses, no edema  Skin: No rashes or lesions        CHRONIC MEDICAL DIAGNOSES:  Problem List as of 1/27/2023 Date Reviewed: 1/27/2023            Codes Class Noted - Resolved    * (Principal) Syncope and collapse ICD-10-CM: R55  ICD-9-CM: 780.2  1/26/2023 - Present        S/P hysterectomy ICD-10-CM: Z90.710  ICD-9-CM: V88.01 12/19/2016 - Present        Anemia ICD-10-CM: D64.9  ICD-9-CM: 285.9  12/19/2016 - Present        Hematuria ICD-10-CM: R31.9  ICD-9-CM: 599.70  12/19/2016 - Present        Postpartum hemorrhage ICD-10-CM: O72.1  ICD-9-CM: 666.10  12/18/2016 - Present        DIC (disseminated intravascular coagulation) ([facility] ICD-10-CM: D65  ICD-9-CM: 286.6  12/18/2016 - Present        RESOLVED: Pregnancy ICD-10-CM: Z34.90  ICD-9-CM: V22.2  12/17/2016 - 12/19/2016           Greater than 30 minutes were spent with the patient on counseling and coordination of care    Signed:   Fabby Shelley MD  1/27/2023  10:27 AM

## 2023-01-27 NOTE — PROGRESS NOTES
Problem: Falls - Risk of  Goal: *Absence of Falls  Description: Document Danne Lobe Fall Risk and appropriate interventions in the flowsheet.   Outcome: Progressing Towards Goal  Note: Fall Risk Interventions:            Medication Interventions: Assess postural VS orthostatic hypotension, Teach patient to arise slowly, Patient to call before getting OOB

## 2023-01-27 NOTE — ED NOTES
TRANSFER - OUT REPORT:    Verbal report given to BASSAM Amezcua(name) on Ruba Zimmerman  being transferred to (unit) for routine progression of care       Report consisted of patients Situation, Background, Assessment and   Recommendations(SBAR). Information from the following report(s) SBAR, Kardex, ED Summary, Intake/Output, MAR, Recent Results, and Med Rec Status was reviewed with the receiving nurse. Lines:   Peripheral IV 01/26/23 Left Antecubital (Active)   Site Assessment Clean, dry, & intact 01/26/23 1817   Phlebitis Assessment 0 01/26/23 1817   Dressing Status Clean, dry, & intact 01/26/23 1817   Dressing Type Transparent 01/26/23 1817   Hub Color/Line Status Green 01/26/23 1817        Opportunity for questions and clarification was provided.       Patient transported with:   Monitor  Registered Nurse

## 2023-01-27 NOTE — TELEPHONE ENCOUNTER
Enrolled with [employer] - Ordered and being shipped to patient's home address on file. ETA within 5-7 business days. ----- Message -----   From: Juventino Wild NP   Sent: 1/27/2023  10:28 AM EST   To: Daniel Belle   Subject: I need a 7 day holter monitor sent to this p*     Hello,     Can you please make arrangements for a 7 day holter to be sent to this patient? She is being d/c today from the hospital. Dr Carlos Orantes saw this pt in consult.      Thanks   Donny Manning Friday

## 2023-01-27 NOTE — PROGRESS NOTES
I have reviewed discharge instructions with the patient. The patient verbalized understanding. Current Discharge Medication List        CONTINUE these medications which have NOT CHANGED    Details   PARoxetine (PaxiL) 20 mg tablet Take 20 mg by mouth daily.

## 2023-01-27 NOTE — ED TRIAGE NOTES
Bedside and Verbal shift change report given to Theo Valverde (oncoming nurse) by Roselyn Trivedi (offgoing nurse). Report included the following information SBAR, Kardex, ED Summary, and MAR.

## 2023-01-27 NOTE — PROGRESS NOTES
Problem: Falls - Risk of  Goal: *Absence of Falls  Description: Document Linda Palmer Fall Risk and appropriate interventions in the flowsheet.   Outcome: Resolved/Met  Note: Fall Risk Interventions:            Medication Interventions: Assess postural VS orthostatic hypotension                   Problem: Patient Education: Go to Patient Education Activity  Goal: Patient/Family Education  Outcome: Resolved/Met

## 2023-01-27 NOTE — H&P
[address]  HISTORY AND PHYSICAL    Name:  Jj Dumas  MR#:  [id number]  :  1975  ACCOUNT #:  [de-identified]  ADMIT DATE:  2023      The patient was seen, evaluated, and admitted by me on 2023. PRIMARY CARE PHYSICIAN:  Bre Onofre MD    SOURCE OF INFORMATION:  The patient and review of ED electronic medical record. CHIEF COMPLAINT:  Passed out. HISTORY OF PRESENT ILLNESS:  This is a 24-year-old woman with past medical history significant for depression presented at the emergency room with syncope. The patient stated that she has not been feeling well all day long, went upstairs in her house, and all of a sudden she passed out. The patient had no recollection of the event. She woke up to her son looking over her. There was no headache, blurring of vision, or dizziness prior to this episode. No abnormal body movement. No tongue biting. No urinary incontinence. The patient sustained an abrasion to the left forehead. The patient has no prior history of TIA or CVA. She was brought to the emergency room for further evaluation. When the patient arrived at the emergency room, CT scan of the head was obtained and this was negative for acute pathology. The CT of the maxillofacial was also obtained which did not show any fracture. The patient was subsequently referred to the hospitalist service for admission. The patient has no record of prior admission to the hospitalist service. PAST MEDICAL HISTORY:  Depression. ALLERGIES:  NO KNOWN DRUG ALLERGIES. MEDICATIONS:  Paxil 20 mg daily. FAMILY HISTORY:  This was reviewed. Mother and father have hypertension. PAST SURGICAL HISTORY:  This is significant for hysterectomy. SOCIAL HISTORY:  No history of alcohol or tobacco abuse. REVIEW OF SYSTEMS:  HEAD, EYES, EARS, NOSE AND THROAT:  This is positive for syncope. No headache, no blurring of vision, and no photophobia.   RESPIRATORY SYSTEM: No cough, no shortness of breath, and no hemoptysis. CARDIOVASCULAR SYSTEM:  No chest pain, no orthopnea, and no palpitation. GASTROINTESTINAL SYSTEM:  No nausea or vomiting, no diarrhea, and no constipation. GENITOURINARY SYSTEM:  No dysuria, no urgency and no frequency. All other systems are reviewed and they are negative. PHYSICAL EXAMINATION:  GENERAL APPEARANCE:  The patient appeared ill and in moderate distress. VITAL SIGNS:  On arrival at the emergency room; temperature 98.6, pulse 104, respiratory rate 20, blood pressure 113/67, and oxygen saturation 99%. HEAD:  Normocephalic. Abrasion, left forehead noted. EARS:  Normal external ears with no drainage. NOSE:  No deformity and no drainage. EYES:  Normal eye movement. No redness, no drainage, and no discharge. MOUTH AND THROAT:  No visible oral lesion. NECK:  Neck is supple. No JVD and no thyromegaly. CHEST:  Clear breath sounds. No wheezing and no crackles. HEART:  Normal S1 and S2, regular. No clinically appreciable murmur. ABDOMEN:  Soft and nontender. Normal bowel sounds. CNS:  Alert and oriented x3. No gross focal neurological deficit. EXTREMITIES:  No edema. Pulses 2+ bilaterally. MUSCULOSKELETAL SYSTEM:  No obvious joint deformity and swelling. SKIN:  No active skin lesions seen in the exposed part of the body. PSYCHIATRY:  Normal mood and affect. LYMPHATIC SYSTEM:  No cervical lymphadenopathy. DIAGNOSTIC DATA:  The CTA of the head, no acute pathology. The CT scan of the maxillofacial, no acute fracture. Chest x-ray, no acute pathology. EKG ordered. LABORATORY DATA:  Hematology; WBC 7.6, hemoglobin 13.0, hematocrit 39.3, and platelets 412. Chemistry; sodium 138, potassium 4.1, chloride 104, CO2 is 26, glucose 108, BUN 16, creatinine 0.82, calcium 8.6, total bilirubin 0.5, ALT 70, AST 48, alkaline phosphatase 60, total protein 7.1, albumin level 3.9, and globulin 3.2.   Cardiac profile, troponin high-sensitivity less than 4. Urinalysis is significant for negative nitrite, trace leukocyte esterase, and negative bacteria. Influenza A and B antigen negative. COVID-19 virus not detected. ASSESSMENT:  1. Syncope. 2.  Depression. PLAN:  1. Syncope. We will admit the patient for further evaluation and treatment. We will check her orthostatic vital signs. We will check MRI and MRA of the brain, carotid Doppler of the neck, and echocardiogram to evaluate the patient for stroke as a possible cause of syncope. We will check serial cardiac markers to rule out acute myocardial infarction as a possible cause of syncope. We will check D-dimer to evaluate the patient for pulmonary embolism as a possible cause of syncope. We will monitor the patient closely. We will carry out fluid therapy. We will also check urine drug screen. 2.  Depression. We will resume preadmission medication. 3.  Other issues. Code status, the patient is a full code. We will place the patient on Lovenox for DVT prophylaxis. FUNCTIONAL STATUS PRIOR TO ADMISSION:  The patient came from home. The patient is ambulatory with no assistive device. COVID PRECAUTION:  The patient was wearing a face mask. I was wearing a face mask and gloves for this patient's encounter. MD MARCELL [email or web address]  D:  01/27/2023 4:53  T:  01/27/2023 5:52  JOB #:  [id number]  CC:   Obinna Aguilera MD

## 2023-01-27 NOTE — CONSULTS
[facility]                       Cardiology Care Note     [x]Initial Consult     []Progress note    Patient Name: Duane Aguilera - TDQ:[id number]  Primary Cardiologist: none  Consulting Cardiologist: [facility] Physicians: Julissa Holden MD     Reason for consult: syncope    1/27/2023     Patient has had diarrhea spread from her family ; she had dizziness when standing up from work call you she was going to get dizzy on to the bathroom try to run to the bathroom sat down and got up when she went to the bathroom had a near syncopal event. Her sons called EMS and she was brought in; she had PVCs otherwise doing well. She is feeling better after hydration reports no prior history of syncope cardiac chest pain chest pressure shortness of breath PND orthopnea. Risk factors social history family history are reviewed. Physical exam is unremarkable with normal cephalic atraumatic anicteric sclera no JVD clear lungs regular rate and rhythm without murmur rub or gallop no pedal edema. Labs are notable for cholesterol 161 LDL 95 sodium 142 creatinine 1.6 D-dimer 0.79 troponin of 4 and TSH is normal.    This appears to be a vasovagal episode related to her diarrhea. Because the PVC is not unreasonable to get a 1 week Holter but I do not have a high suspicion is that may be on dehydration   Dispo and plans per Hospitalist/Internal Medicine team     Patient seen earlier today and examined  and agree with Advance Practice Provider (EDWIN, NP,PA)  assessment and plans. Julissa Holden MD        HPI:  Omar Nelson is a 52 y.o. female with PMH significant for depression. She presented to the hospital via EMS after having a syncopal event. She reports the day prior her son had a GI bug with vomiting/diarrhea. She had been caring for him, had minimal sleep the night prior to admission d/t helping care for him.  Yesterday morning she woke up feeling poorly and developed vomiting/diarrhea. She continued to work and in the evening got up to go to the BR d/t need to have BM and felt lightheaded. She pushed through the feeling and after getting downstairs passed out. She was aware as she was passing out that his was occurring. She did wake up quickly on the ground. Her sons were home and witnessed event. No noted seizures, tongue biting or urinary incontinence. Her sons called EMS. In ED EKG showed SR with HR 78. Labs were fairly unremarkable with HS troponin <4, 4. She has been started on IVF. HR on telemetry trending overall in 70s. She did have frequent PVCs noted, at times with bigeminy. She was given Metoprolol XL x1 with resolution of PVCs. SUBJECTIVE:  Finn Garcia reports no symptoms of CP, SOB, palpitations prior to event. Denies prior syncope outside of a time having a dental implant and getting lightheaded. She has no family hx of heart issues. Since hospital admission she has had no further symptoms. Assessment and Plan     Syncope: pt with GI illness with vomiting/diarrhea and poor po intake. She had syncope after standing up and feeling lightheaded. Having an awareness she was going to pass out. Her EKG showed SR. Telemetry has been NSR with some bigeminy. She has been given IVF and is asymptomatic. She can be d/c today from cardiology standpoint. We are arranging for a 7 day holter to be sent to her for further evaluation.           ____________________________________________________________    Cardiac testing              Most recent HS troponins:  Recent Labs     01/27/23  0457 01/26/23  1823   TROPHS 4 <4       ECG: SR    Review of Systems:    [x]All other systems reviewed and all negative except as written in HPI    [] Patient unable to provide secondary to condition    Past Medical History:   Diagnosis Date    Abnormal Pap smear     in teens repeat fine    Abnormal Papanicolaou smear of cervix     at age 23 - \"froze off abnormal cells\"    HX OTHER MEDICAL     5 D&C  2006-twice, 2007, 2010 twice    Infertility     7 miscarriages    Infertility, female     hx of 10 miscarriages - reason unknown    Postpartum depression     took meds after 2nd delivery     No past surgical history on file. Social Hx:  reports that she quit smoking about 13 years ago. She does not have any smokeless tobacco history on file. She reports that she does not drink alcohol and does not use drugs. Family Hx: family history includes Cancer in her maternal grandmother and paternal grandfather; Hypertension in her father, maternal grandfather, maternal grandmother, and mother. No Known Allergies       OBJECTIVE:  Wt Readings from Last 3 Encounters:   01/26/23 85.3 kg (188 lb)   12/21/16 90.1 kg (198 lb 10.2 oz)   12/16/16 92.5 kg (204 lb)     No intake or output data in the 24 hours ending 01/27/23 1046    Physical Exam:    Vitals:   Vitals:    01/27/23 0415 01/27/23 0602 01/27/23 0901 01/27/23 1000   BP: 106/70  115/82    Pulse: 79 78 85 75   Resp: 15  15    Temp: 98.7 °F (37.1 °C)  99.2 °F (37.3 °C)    SpO2: 98%  96%    Weight:       Height:         Telemetry: normal sinus rhythm    Gen: Well-developed, well-nourished, in no acute distress  Neck: Supple, No JVD, No Carotid Bruit  Resp: No accessory muscle use, Clear breath sounds, No rales or rhonchi  Card: Regular Rate,Rhythm, Normal S1, S2, No murmurs, rubs or gallop. No thrills.    Abd:   Soft, non-tender, non-distended, BS+   MSK: No cyanosis  Skin: No rashes    Neuro: Moving all four extremities, follows commands appropriately  Psych: Good insight, oriented to person, place, alert, Nml Affect  LE: No edema    Data Review:     Radiology:   XR Results (most recent):  Results from Hospital Encounter encounter on 01/26/23    XR CHEST PA LAT    Narrative  EXAM: XR CHEST PA LAT    INDICATION: Near syncope    COMPARISON: December 2016    TECHNIQUE: PA and lateral chest views    FINDINGS: The cardiac size is within normal limits. The pulmonary vasculature is  within normal limits. The lungs and pleural spaces are clear. The visualized bones and upper abdomen  are age-appropriate. Impression  Normal PA and lateral chest views. Recent Labs     01/27/23 0457 01/26/23  1823    138   K 3.6 4.1   * 104   CO2 26 26   BUN 10 16   CREA 0.60 0.82   * 108*   PHOS 3.2  --    CA 8.3* 8.6     Recent Labs     01/27/23 0457 01/26/23  1823   WBC 4.0 7.6   HGB 11.9 13.0   HCT 36.1 39.3    193     Recent Labs     01/27/23 0457 01/26/23  1823   AP 52 60     Recent Labs     01/27/23 0457   CHOL 161   LDLC 95.6         Current meds:    Current Facility-Administered Medications:     PARoxetine (PAXIL) tablet 20 mg, 20 mg, Oral, DAILY, Brandin Foster MD, 20 mg at 01/27/23 0840    sodium chloride (NS) flush 5-40 mL, 5-40 mL, IntraVENous, Q8H, Brandin Foster MD, 10 mL at 01/27/23 3511    sodium chloride (NS) flush 5-40 mL, 5-40 mL, IntraVENous, PRN, Brandin Foster MD    acetaminophen (TYLENOL) tablet 650 mg, 650 mg, Oral, Q6H PRN, 650 mg at 01/27/23 0840 **OR** acetaminophen (TYLENOL) suppository 650 mg, 650 mg, Rectal, Q6H PRN, Brandin Foster MD    polyethylene glycol (MIRALAX) packet 17 g, 17 g, Oral, DAILY PRN, Brandin Foster MD    ondansetron (ZOFRAN ODT) tablet 4 mg, 4 mg, Oral, Q8H PRN **OR** ondansetron (ZOFRAN) injection 4 mg, 4 mg, IntraVENous, Q6H PRN, Brandin Foster MD    enoxaparin (LOVENOX) injection 40 mg, 40 mg, SubCUTAneous, DAILY, Brandin Foster MD, 40 mg at 01/27/23 0842    lactated Ringers infusion, 100 mL/hr, IntraVENous, CONTINUOUS, Brandin Foster MD, Last Rate: 100 mL/hr at 01/27/23 0633, 100 mL/hr at 01/27/23 0633    iopamidoL (ISOVUE-370) 370 mg iodine /mL (76 %) injection 100 mL, 100 mL, IntraVENous, RAD ONCE, Denson Banister, MD Rayna Goldmann, NP    [facility]  Call center: Chinmay Castro [phone]      CC: Katie Dempsey MD ELISEO

## 2023-01-27 NOTE — DISCHARGE INSTRUCTIONS
Dear Iza December,    Thank you for choosing [facility] for your healthcare needs. We strive to provide EXCELLENT care to you and your family. In an effort to explain clearly why you were here in the hospital, I've also written a very brief summary below. Other details including formal diagnosis, medication changes, and follow up appointment recommendations can also be found in this packet. You were admitted for passing out due to dehydration and vasovagal response for which you were started on IV fluids and closely monitored. You also received care from specialist physicians in the following specialties:  - Cardiology     Here are the updates to your medication list:  **No changes     Remember that it is important for you to take your medications exactly as they are prescribed. It is helpful to keep a list of your medication with the names, dosages, and times to be taken in your wallet. Additionally,   - Please make sure to follow up with your primary care physician within 1-2 weeks of discharge for hospital follow up. You should also follow up with Cardiology regarding your Holter monitor   - Please make sure to continue to monitor for: Chest pain, shortness of breath, high fevers, and return to the Emergency Department with any of these symptoms.   - Please get up slowly from a seated or laying position, avoid falls. - Avoid tobacco, alcohol and other illicit drug use. - Diet restrictions: None, drink plenty of fluids  - Activity restrictions: None  - Wound care: none     Make sure to also see your primary care doctor for follow-up. Bring these papers with you and be sure to review your medication list with your doctor. I cannot stress the importance of follow up enough.  I've included the information for your follow-up appointments below:      FOLLOW UP APPOINTMENTS:    Follow-up Information       Follow up With Specialties Details Why Contact Info    Burley Hamman, MD Obstetrics & Gynecology, Gynecology, Obstetrics   [place]  [address] [place] [address]  [phone]               At this time, the following test results are still pending: None  Again, please follow-up these results with your primary care provider. Should you have any fever over 101 degrees for 24 hours, chest pain, shortness of breath, fever, chills, nausea, vomiting, diarrhea, change in mentation, falling, weakness, bleeding, or worsening pain, please seek medical attention immediately. Finally, as your discharging physician, you may be receiving a survey regarding my care. I would greatly value and appreciate your input in the survey as we strive for excellence. If you have any questions, I can be reached at [phone].   Thank you so much again for allowing [facility]    Respectfully yours,  Noelle Hoang MD

## 2023-01-27 NOTE — PROGRESS NOTES
Transition of Care Plan  RUR: 3%  DISPOSITION: The disposition plan is home with family assistance  F/U with PCP/Specialist    Transport:          Reason for Admission:  syncope & collapse                      RUR Score:          3%           Plan for utilizing home health:      no recommendations     PCP: First and Last name:  Dimple Patel MD     Name of Practice:    Are you a current patient: Yes/No:    Approximate date of last visit:    Can you participate in a virtual visit with your PCP:                     Current Advanced Directive/Advance Care Plan: Full Code      Healthcare Decision Maker:   Click here to complete [address] including selection of the Healthcare Decision Maker Relationship (ie \"Primary\")                             Transition of Care Plan:                      CRM met with patient and patient's  bedside. Introduced self, explained role, verified demographics, and offered assistance. Patient lives with her  and is independent in her ADLs/IADLs. Patient uses [facility]. Care Management Interventions  PCP Verified by CM: Yes  Palliative Care Criteria Met (RRAT>21 & CHF Dx)?: No  Mode of Transport at Discharge:  Other (see comment) ()  Transition of Care Consult (CM Consult): Discharge Planning  MyChart Signup: No  Discharge Durable Medical Equipment: No  Health Maintenance Reviewed: Yes  Physical Therapy Consult: No  Occupational Therapy Consult: No  Speech Therapy Consult: No  Support Systems: Spouse/Significant Other  Confirm Follow Up Transport: Self   Resource Information Provided?: No  Discharge Location  Patient Expects to be Discharged to[de-identified] Home with family assistance      9:42 AM  SHUN Candelario

## 2023-01-28 NOTE — PROGRESS NOTES
Bedside and Verbal shift change report given to BASSAM Rivera (oncoming nurse) by Lex Chaves (offgoing nurse). Report included the following information SBAR, Kardex, Intake/Output, MAR, and Recent Results.